# Patient Record
Sex: MALE | Race: WHITE | NOT HISPANIC OR LATINO
[De-identification: names, ages, dates, MRNs, and addresses within clinical notes are randomized per-mention and may not be internally consistent; named-entity substitution may affect disease eponyms.]

---

## 2022-10-20 ENCOUNTER — APPOINTMENT (OUTPATIENT)
Dept: UROLOGY | Facility: CLINIC | Age: 57
End: 2022-10-20

## 2022-10-20 VITALS
WEIGHT: 180 LBS | TEMPERATURE: 98.2 F | SYSTOLIC BLOOD PRESSURE: 123 MMHG | DIASTOLIC BLOOD PRESSURE: 73 MMHG | HEART RATE: 76 BPM | HEIGHT: 66 IN | BODY MASS INDEX: 28.93 KG/M2

## 2022-10-20 DIAGNOSIS — Z95.5 PRESENCE OF CORONARY ANGIOPLASTY IMPLANT AND GRAFT: ICD-10-CM

## 2022-10-20 DIAGNOSIS — Z13.9 ENCOUNTER FOR SCREENING, UNSPECIFIED: ICD-10-CM

## 2022-10-20 DIAGNOSIS — R30.0 DYSURIA: ICD-10-CM

## 2022-10-20 DIAGNOSIS — Z86.39 PERSONAL HISTORY OF OTHER ENDOCRINE, NUTRITIONAL AND METABOLIC DISEASE: ICD-10-CM

## 2022-10-20 DIAGNOSIS — I25.758 ATHEROSCLEROSIS OF NATIVE CORONARY ARTERY OF TRANSPLANTED HEART WITH OTHER FORMS OF ANGINA PECTORIS: ICD-10-CM

## 2022-10-20 DIAGNOSIS — E78.5 HYPERLIPIDEMIA, UNSPECIFIED: ICD-10-CM

## 2022-10-20 DIAGNOSIS — F17.200 NICOTINE DEPENDENCE, UNSPECIFIED, UNCOMPLICATED: ICD-10-CM

## 2022-10-20 DIAGNOSIS — I25.10 ATHEROSCLEROTIC HEART DISEASE OF NATIVE CORONARY ARTERY W/OUT ANGINA PECTORIS: ICD-10-CM

## 2022-10-20 LAB
BILIRUB UR QL STRIP: NEGATIVE
CLARITY UR: NORMAL
COLLECTION METHOD: NORMAL
GLUCOSE UR-MCNC: NEGATIVE
HCG UR QL: 0.2 EU/DL
HGB UR QL STRIP.AUTO: NORMAL
KETONES UR-MCNC: NEGATIVE
LEUKOCYTE ESTERASE UR QL STRIP: NEGATIVE
NITRITE UR QL STRIP: NEGATIVE
PH UR STRIP: 7
PROT UR STRIP-MCNC: NEGATIVE
SP GR UR STRIP: 1.02

## 2022-10-20 PROCEDURE — 99204 OFFICE O/P NEW MOD 45 MIN: CPT

## 2022-10-20 PROCEDURE — 81003 URINALYSIS AUTO W/O SCOPE: CPT | Mod: QW

## 2022-10-20 PROCEDURE — 51798 US URINE CAPACITY MEASURE: CPT

## 2022-10-20 RX ORDER — ATORVASTATIN CALCIUM 40 MG/1
40 TABLET, FILM COATED ORAL
Refills: 0 | Status: ACTIVE | COMMUNITY
Start: 2022-10-08

## 2022-10-20 RX ORDER — KRILL/OM-3/DHA/EPA/PHOSPHO/AST 1000-230MG
81 CAPSULE ORAL
Refills: 0 | Status: ACTIVE | COMMUNITY

## 2022-10-20 RX ORDER — DIAZEPAM 2 MG/1
2 TABLET ORAL ONCE
Qty: 2 | Refills: 0 | Status: ACTIVE | COMMUNITY
Start: 2022-10-20 | End: 1900-01-01

## 2022-10-20 NOTE — HISTORY OF PRESENT ILLNESS
[FreeTextEntry1] : 57M presents for initial evaluation of dysuria\par \par PMH significant for: CAD, HLD, current smoker (>20 pack year)\par PSH significant for: Cardiac stent\par Significant meds: nothing \par \par Patient reports weeks-long history of intermittent dysuria, BL inner thigh pain\par Reports moderate level of distress \par Associated with nocturia\par Denies fevers, chills, nausea, vomiting, flank pain, gross hematuria \par Sometimes relieved with urination\par Previously treated with nothing \par \par UA today shows large blood, otherwise WNL\par Hx of tobacco use: Yes, current smoker \par Hx of environmental exposures: No\par Hx of nephrolithiasis: No\par Personal Hx of hematuria previously: No\par Family Hx of  malignancy: No\par \par Daytime Frequency: 5-6\par Nighttime Frequency: 4-5\par Pads per day: 0\par Straining to void: No\par Intermittency: Yes\par Dribbling: No\par Subjective Incomplete Emptying: Sometimes\par UTIs this past year: 0\par \par Daily Fluid Total: 32 ounces\par Daily Caffeine Total: 32 ounces\par \par Neurologic Hx, Vision or Balance changes: No	 \par Erections: Normal\par \par IPSS: 19, moderate symptoms mostly irritative symptoms\par QOL score 4, mostly dissatisfied\par \par Of note, denies h/o prostate cancer screening

## 2022-10-20 NOTE — ASSESSMENT
[FreeTextEntry1] : Mr. Hylton has seen me today for initial evaluation of intermittent dysuria.  This is a nonspecific, intermittent symptom that he associates with bilateral inner thigh pain.  He has no other constitutional symptoms and denies a history of urinary tract infections.  A urinalysis today is notable for large blood in the absence of other indications of infection.  His PVR is less than 50. We discussed how the distribution of his thigh pain bilaterally is unlikely related to a urinary source. \par \par He has a normal digital rectal exam.  There is no prostatic tenderness to suggest prostatitis.\par \par Additionally, he has an IPSS score of 19, suggesting moderate urinary symptoms.  He states that his quality of life is not satisfactory.  He is most bothered by nocturia (4-5 times) in the absence of significant obstructive voiding complaints. He does confirm drinking 32 oz of caffeine daily. We discussed limiting caffeine intake after 12 PM and switching to non- caffeinated fluids in the afternoon, evening. \par \par A urinalysis shows large blood. As a current smoker per [employer] (AUA) 2020 guidelines, the patient is high risk for malignancy. We discussed that in the vast majority of cases, the source of microscopic hematuria is unknown but can also include prostatic bleeding, urinary tract stones, our other structural abnormalities. \par \par Recommendations\par -PSA for screening\par -CMP\par -UA with micro\par -CT urogram - evaluate upper tracts. Pre-procedure diazepam provided to patient\par -cystoscopy - evaluate for hematuria. Will perform a concomitant uroflow to rule out bladder outlet obstruction\par \par

## 2022-10-20 NOTE — PHYSICAL EXAM
[General Appearance - Well Developed] : well developed [General Appearance - Well Nourished] : well nourished [Abdomen Soft] : soft [Abdomen Tenderness] : non-tender [Abdomen Hernia] : no hernia was discovered [Costovertebral Angle Tenderness] : no ~M costovertebral angle tenderness [Urethral Meatus] : meatus normal [Penis Abnormality] : normal uncircumcised penis [Testes Tenderness] : no tenderness of the testes [Prostate Enlargement] : the prostate was not enlarged [Prostate Tenderness] : the prostate was not tender [No Prostate Nodules] : no prostate nodules [FreeTextEntry1] : small condyloma x 2 on foreskin

## 2022-10-21 LAB
ALBUMIN SERPL ELPH-MCNC: 5 G/DL
ALP BLD-CCNC: 75 U/L
ALT SERPL-CCNC: 31 U/L
ANION GAP SERPL CALC-SCNC: 14 MMOL/L
AST SERPL-CCNC: 21 U/L
BILIRUB SERPL-MCNC: 0.4 MG/DL
BUN SERPL-MCNC: 14 MG/DL
CALCIUM SERPL-MCNC: 10.3 MG/DL
CHLORIDE SERPL-SCNC: 103 MMOL/L
CO2 SERPL-SCNC: 23 MMOL/L
CREAT SERPL-MCNC: 0.56 MG/DL
EGFR: 116 ML/MIN/1.73M2
GLUCOSE SERPL-MCNC: 90 MG/DL
POTASSIUM SERPL-SCNC: 4.8 MMOL/L
PROT SERPL-MCNC: 7.7 G/DL
SODIUM SERPL-SCNC: 140 MMOL/L

## 2022-10-23 LAB
APPEARANCE: ABNORMAL
BACTERIA: NEGATIVE
BILIRUBIN URINE: NEGATIVE
BLOOD URINE: NEGATIVE
CALCIUM OXALATE CRYSTALS: ABNORMAL
COLOR: YELLOW
GLUCOSE QUALITATIVE U: NEGATIVE
HYALINE CASTS: 0 /LPF
KETONES URINE: NEGATIVE
LEUKOCYTE ESTERASE URINE: NEGATIVE
MICROSCOPIC-UA: NORMAL
NITRITE URINE: NEGATIVE
PH URINE: 7
PROTEIN URINE: NORMAL
RED BLOOD CELLS URINE: 0 /HPF
SPECIFIC GRAVITY URINE: 1.02
SQUAMOUS EPITHELIAL CELLS: 0 /HPF
URINE COMMENTS: NORMAL
UROBILINOGEN URINE: NORMAL
WHITE BLOOD CELLS URINE: 0 /HPF

## 2022-10-28 ENCOUNTER — RESULT REVIEW (OUTPATIENT)
Age: 57
End: 2022-10-28

## 2022-11-09 ENCOUNTER — APPOINTMENT (OUTPATIENT)
Dept: CT IMAGING | Facility: HOSPITAL | Age: 57
End: 2022-11-09

## 2022-11-09 ENCOUNTER — OUTPATIENT (OUTPATIENT)
Dept: OUTPATIENT SERVICES | Facility: HOSPITAL | Age: 57
LOS: 1 days | End: 2022-11-09
Payer: COMMERCIAL

## 2022-11-09 DIAGNOSIS — R31.29 OTHER MICROSCOPIC HEMATURIA: ICD-10-CM

## 2022-11-09 PROCEDURE — 74178 CT ABD&PLV WO CNTR FLWD CNTR: CPT

## 2022-11-09 PROCEDURE — 74178 CT ABD&PLV WO CNTR FLWD CNTR: CPT | Mod: 26

## 2022-11-15 ENCOUNTER — APPOINTMENT (OUTPATIENT)
Dept: UROLOGY | Facility: CLINIC | Age: 57
End: 2022-11-15
Payer: COMMERCIAL

## 2022-11-15 ENCOUNTER — APPOINTMENT (OUTPATIENT)
Dept: UROLOGY | Facility: CLINIC | Age: 57
End: 2022-11-15

## 2022-11-15 VITALS
DIASTOLIC BLOOD PRESSURE: 81 MMHG | HEART RATE: 86 BPM | BODY MASS INDEX: 28.93 KG/M2 | SYSTOLIC BLOOD PRESSURE: 132 MMHG | HEIGHT: 66 IN | OXYGEN SATURATION: 98 % | TEMPERATURE: 98.2 F | WEIGHT: 180 LBS

## 2022-11-15 DIAGNOSIS — R35.1 NOCTURIA: ICD-10-CM

## 2022-11-15 DIAGNOSIS — R31.29 OTHER MICROSCOPIC HEMATURIA: ICD-10-CM

## 2022-11-15 PROCEDURE — 52000 CYSTOURETHROSCOPY: CPT

## 2022-11-15 PROCEDURE — 51741 ELECTRO-UROFLOWMETRY FIRST: CPT

## 2022-11-15 PROCEDURE — 51798 US URINE CAPACITY MEASURE: CPT

## 2022-11-15 PROCEDURE — 99214 OFFICE O/P EST MOD 30 MIN: CPT | Mod: 25

## 2022-11-15 RX ORDER — OXYBUTYNIN CHLORIDE 10 MG/1
10 TABLET, EXTENDED RELEASE ORAL
Qty: 30 | Refills: 6 | Status: ACTIVE | COMMUNITY
Start: 2022-11-15 | End: 1900-01-01

## 2022-11-15 NOTE — PHYSICAL EXAM
[General Appearance - Well Developed] : well developed [General Appearance - Well Nourished] : well nourished [Abdomen Soft] : soft [Abdomen Tenderness] : non-tender [Abdomen Hernia] : no hernia was discovered [Costovertebral Angle Tenderness] : no ~M costovertebral angle tenderness

## 2022-11-17 NOTE — ASSESSMENT
[FreeTextEntry1] : Mr. Hylton has seen me today for follow up evaluation of nocturia and microscopic hematuria. Confirms his dysuria has resolved and the he continues to suffer from BL thigh and foot pain. This wakes him up and night and prompts him to void. He continues to undergo evaluation for the etiology of this pain. \par \par He has a normal digital rectal exam.  \par \par Microscopic hematuria workup is negative. Imaging reviewed with patient. No significant BPH was noted on cystoscopy.\par \par Uroflow shows a normal flow rate (19 cc/s) and curve. PVR < 50.\par \par He is most bothered by his night-time urinary frequency.  We have extensively reviewed the [facility]AB care pathway and discussed: \par \par 1st line therapy:  \par       -Behavioral therapy -limiting fluid intake, caffeine, and artificial sweeteners; maintaining a normal weight \par       -Medical comorbidities - managing sleep apnea, diabetes, lower extremity edema, and CHF if present  \par       -Conservative management options - observation, pelvic floor physical therapy \par \par 2nd line therapy:  \par       -Medications (anticholinergics, Beta-agonists) - reviewed risks of dry eyes, dry mouth, constipation mental status changes. \par \par Recommendations\par -1st line [facility] therapy\par -Oxybutynin ER 10\par -PSA\par -RTC 1 mo

## 2022-11-17 NOTE — HISTORY OF PRESENT ILLNESS
[FreeTextEntry1] : 57M presents for follow up of nocturia, microscopic hematuria\par \par PMH significant for: CAD, HLD, current smoker (>20 pack year)\par PSH significant for: Cardiac stent\par Significant meds: nothing \par \par UA: large blood, otherwise WNL\par Hx of tobacco use: Yes, current smoker \par CTU: WNL\par Cysto today to complete workup\par \par Dysuria resolved, continues to complain of BL inner thigh and foot pain\par Pain wakes him up and night and then he voids\par Daytime Frequency: 5-6\par Nighttime Frequency: 4-5, moderate volume\par Pads per day: 0\par \par Daily Fluid Total: 32 ounces\par Daily Caffeine Total: 32 ounces\par \par IPSS: 19, moderate symptoms mostly irritative symptoms\par QOL score 4, mostly dissatisfied\par \par Uroflow: Vol: 450 cc, Qmax: 19 cc/s, normal flow curve, PVR: <50\par \par Of note, denies h/o prostate cancer screening

## 2022-12-19 ENCOUNTER — APPOINTMENT (OUTPATIENT)
Dept: UROLOGY | Facility: CLINIC | Age: 57
End: 2022-12-19